# Patient Record
Sex: MALE | Race: WHITE | URBAN - METROPOLITAN AREA
[De-identification: names, ages, dates, MRNs, and addresses within clinical notes are randomized per-mention and may not be internally consistent; named-entity substitution may affect disease eponyms.]

---

## 2017-06-07 ENCOUNTER — OFFICE VISIT (OUTPATIENT)
Dept: OTOLARYNGOLOGY | Facility: CLINIC | Age: 22
End: 2017-06-07
Payer: COMMERCIAL

## 2017-06-07 ENCOUNTER — OFFICE VISIT (OUTPATIENT)
Dept: FAMILY MEDICINE | Facility: CLINIC | Age: 22
End: 2017-06-07
Payer: COMMERCIAL

## 2017-06-07 VITALS
WEIGHT: 207 LBS | BODY MASS INDEX: 28.04 KG/M2 | SYSTOLIC BLOOD PRESSURE: 127 MMHG | HEART RATE: 51 BPM | DIASTOLIC BLOOD PRESSURE: 75 MMHG | TEMPERATURE: 98.9 F | HEIGHT: 72 IN

## 2017-06-07 VITALS — WEIGHT: 207 LBS | BODY MASS INDEX: 28.04 KG/M2 | HEIGHT: 72 IN | TEMPERATURE: 97 F

## 2017-06-07 DIAGNOSIS — R49.0 HOARSENESS: Primary | ICD-10-CM

## 2017-06-07 DIAGNOSIS — Z23 ENCOUNTER FOR IMMUNIZATION: ICD-10-CM

## 2017-06-07 DIAGNOSIS — J04.0 LARYNGITIS: ICD-10-CM

## 2017-06-07 DIAGNOSIS — Z13.6 CARDIOVASCULAR SCREENING; LDL GOAL LESS THAN 160: ICD-10-CM

## 2017-06-07 LAB
CHOLEST SERPL-MCNC: 147 MG/DL
GLUCOSE SERPL-MCNC: 87 MG/DL (ref 70–99)
HDLC SERPL-MCNC: 55 MG/DL
LDLC SERPL CALC-MCNC: 79 MG/DL
NONHDLC SERPL-MCNC: 92 MG/DL
TRIGL SERPL-MCNC: 63 MG/DL

## 2017-06-07 PROCEDURE — 80061 LIPID PANEL: CPT | Performed by: FAMILY MEDICINE

## 2017-06-07 PROCEDURE — 36415 COLL VENOUS BLD VENIPUNCTURE: CPT | Performed by: FAMILY MEDICINE

## 2017-06-07 PROCEDURE — 90471 IMMUNIZATION ADMIN: CPT | Performed by: FAMILY MEDICINE

## 2017-06-07 PROCEDURE — 99213 OFFICE O/P EST LOW 20 MIN: CPT | Mod: 25 | Performed by: FAMILY MEDICINE

## 2017-06-07 PROCEDURE — 99204 OFFICE O/P NEW MOD 45 MIN: CPT | Mod: 25 | Performed by: OTOLARYNGOLOGY

## 2017-06-07 PROCEDURE — 90715 TDAP VACCINE 7 YRS/> IM: CPT | Performed by: FAMILY MEDICINE

## 2017-06-07 PROCEDURE — 82947 ASSAY GLUCOSE BLOOD QUANT: CPT | Performed by: FAMILY MEDICINE

## 2017-06-07 PROCEDURE — 31575 DIAGNOSTIC LARYNGOSCOPY: CPT | Performed by: OTOLARYNGOLOGY

## 2017-06-07 RX ORDER — METHYLPREDNISOLONE 4 MG
TABLET, DOSE PACK ORAL
Qty: 21 TABLET | Refills: 0 | Status: SHIPPED | OUTPATIENT
Start: 2017-06-07 | End: 2017-06-15

## 2017-06-07 ASSESSMENT — PAIN SCALES - GENERAL: PAINLEVEL: NO PAIN (0)

## 2017-06-07 NOTE — LETTER
[facility]  [address] [facility] [place] [address]  Phone: [phone]    June 7, 2017    Jefry Do  [address] [place] [address]          Dear Mr. Lei,    The results of your recent lab tests were within normal limits.  Component      Latest Ref Rng & Units 6/7/2017   Cholesterol      <200 mg/dL 147   Triglycerides      <150 mg/dL 63   HDL Cholesterol      >39 mg/dL 55   LDL Cholesterol Calculated      <100 mg/dL 79   Non HDL Cholesterol      <130 mg/dL 92   Glucose      70 - 99 mg/dL 87      If you have any further questions or problems, please contact our office.    Sincerely,      Shilo Hutchison MD / estephania

## 2017-06-07 NOTE — MR AVS SNAPSHOT
"              After Visit Summary   6/7/2017    Jefry Lei    MRN: [id number]           Patient Information     Date Of Birth          1995        Visit Information        Provider Department      6/7/2017 10:15 AM Qing Price MD [facility]        Today's Diagnoses     Hoarseness    -  1    Laryngitis          Care Instructions    Per Physician's instructions            Follow-ups after your visit        Additional Services     SPEECH THERAPY REFERRAL       *This therapy referral will be filtered to a centralized scheduling office at [facility] and the patient will receive a call to schedule an appointment at a [place] location most convenient for them. *     [facility] provides Speech Therapy evaluation and treatment and many specialty services across the [place] system.  If requesting a specialty program, please choose from the list below.  If you have not heard from the scheduling office within 2 business days, please call [phone] for all locations, with the exception of [place], please call [phone].       Treatment: Evaluation & Treatment  Speech Treatment Diagnosis: Dysphonia  Special Instructions: professional voice  Special Programs: Voice     Please be aware that coverage of these services is subject to the terms and limitations of your health insurance plan.  Call member services at your health plan with any benefit or coverage questions.      **Note to Provider:  If you are referring outside of [place] for the therapy appointment, please list the name of the location in the \"special instructions\" above, print the referral and give to the patient to schedule the appointment.                  Who to contact     If you have questions or need follow up information about today's clinic visit or your schedule please contact [facility] directly at [phone].  Normal or non-critical lab and imaging results will be " "communicated to you by [employer]azeM[employer], letter or phone within 4 business days after the clinic has received the results. If you do not hear from us within 7 days, please contact the clinic through StepOne or phone. If you have a critical or abnormal lab result, we will notify you by phone as soon as possible.  Submit refill requests through StepOne or call your pharmacy and they will forward the refill request to us. Please allow 3 business days for your refill to be completed.          Additional Information About Your Visit        StepOne Information     StepOne lets you send messages to your doctor, view your test results, renew your prescriptions, schedule appointments and more. To sign up, go to [email or web address] . Click on \"Log in\" on the left side of the screen, which will take you to the Welcome page. Then click on \"Sign up Now\" on the right side of the page.     You will be asked to enter the access code listed below, as well as some personal information. Please follow the directions to create your username and password.     Your access code is: [email or web address]  Expires: 2017  8:03 AM     Your access code will  in 90 days. If you need help or a new code, please call your [place] clinic or [phone].        Care EveryWhere ID     This is your Care EveryWhere ID. This could be used by other organizations to access your [facility] medical records  [id number]        Your Vitals Were     Pulse Temperature Height BMI (Body Mass Index)          51 98.9  F (37.2  C) (Oral) 1.829 m (6' 0.02\") 28.05 kg/m2         Blood Pressure from Last 3 Encounters:   17 127/75   14 141/62   14 123/70    Weight from Last 3 Encounters:   17 93.9 kg (207 lb)   17 93.9 kg (207 lb)   14 87.3 kg (192 lb 6.4 oz)              We Performed the Following     LARYNGOSCOPY FLEX FIBEROPTIC, DIAGNOSTIC     SPEECH THERAPY REFERRAL          Today's Medication Changes          These changes are " accurate as of: 6/7/17 10:37 AM.  If you have any questions, ask your nurse or doctor.               Start taking these medicines.        Dose/Directions    methylPREDNISolone 4 MG tablet   Commonly known as:  MEDROL DOSEPAK   Used for:  Hoarseness, Laryngitis   Started by:  Qing Price MD        Follow package instructions   Quantity:  21 tablet   Refills:  0            Where to get your medicines      These medications were sent to [facility] [address] [facility] [address] AT [facility] OF [address] [place] [address]  [address] [facility] [address]     Phone:  [phone]     methylPREDNISolone 4 MG tablet                Primary Care Provider Office Phone # Fax #    Shilo Hutchison [phone]       [facility]        [address] [facility] [address] [facility] [address]        Thank you!     Thank you for choosing [facility]  for your care. Our goal is always to provide you with excellent care. Hearing back from our patients is one way we can continue to improve our services. Please take a few minutes to complete the written survey that you may receive in the mail after your visit with us. Thank you!             Your Updated Medication List - Protect others around you: Learn how to safely use, store and throw away your medicines at www.disposemymeds.org.          This list is accurate as of: 6/7/17 10:37 AM.  Always use your most recent med list.                   Brand Name Dispense Instructions for use    methylPREDNISolone 4 MG tablet    MEDROL DOSEPAK    21 tablet    Follow package instructions

## 2017-06-07 NOTE — PROGRESS NOTES
SUBJECTIVE:                                                    Jefry Lei is a 22 year old male who presents to clinic today for the following health issues:    Loss of Voice       Duration: 3-4 weeks now     Description (location/character/radiation): pt is concerned about a loss of voice. 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 Ever since then he has had a hoarse voice. He thought it was getting better but it has not.  Pt denies any other cold or flu symptoms, no throat pain.        Patient denies tobacco smoking or any use of tobacco products.  Patient states he has had hoarseness about twice a year since he graduated high school, but usually would get it back within a week.  This time, his hoarseness just persisted for the last 3-4 weeks.  This time, patient has not tried any treatment per his report.  Patient is a  and a .  Patient denies known seasonal allergies.  Patient has been exposed to second hand smoke last year only; denies current exposure to smoke or other environmental pollutants.    Problem list and histories reviewed & adjusted, as indicated.  Additional history: as documented    Patient Active Problem List   Diagnosis     CARDIOVASCULAR SCREENING; LDL GOAL LESS THAN 160     Past Surgical History:   Procedure Laterality Date     SURGICAL HISTORY OF -   2014    wisdom teeth removal       Social History   Substance Use Topics     Smoking status: Never Smoker     Smokeless tobacco: Never Used     Alcohol use No     Family History   Problem Relation Age of Onset     Hypertension Mother          Current Outpatient Prescriptions   Medication Sig Dispense Refill     methylPREDNISolone (MEDROL DOSEPAK) 4 MG tablet Follow package instructions 21 tablet 0     No Known Allergies    Reviewed and updated as needed this visit by clinical staff  Tobacco  Allergies  Meds  Problems  Med Hx  Surg Hx  Fam Hx  Soc Hx        Reviewed and  "updated as needed this visit by Provider  Allergies  Meds  Problems         ROS:  C: NEGATIVE for fever, chills, change in weight  I: NEGATIVE for worrisome rashes, moles or lesions  E: NEGATIVE for vision changes or irritation  ENT/MOUTH: see above  RESP:as above  CV: NEGATIVE for chest pain, palpitations or peripheral edema  GI: NEGATIVE for nausea, abdominal pain, heartburn, or change in bowel habits  M: NEGATIVE for significant arthralgias or myalgia    OBJECTIVE:                                                    Temp 97  F (36.1  C) (Tympanic)  Ht 6' 0.25\" (1.835 m)  Wt 207 lb (93.9 kg)  BMI 27.88 kg/m2  Body mass index is 27.88 kg/(m^2).  GENERAL: alert and no distress  HENT: Ears - tympanic membrane intact and nonerythematous; Nose - no congestion, pale swollen turbinates, no mucus present, no sinus tenderness bilaterally; throat nonerythematous; hoarse voice  NECK: nontender anterior cervical lymphadenopathy present.  RESP: lungs clear to auscultation - no rales, no rhonchi, no wheezes  CV: regular rates and rhythm, normal S1 S2, no S3 or S4 and no murmur  SKIN:no rashes    Diagnostic test results:  Diagnostic Test Results:  none      ASSESSMENT/PLAN:                                                        ICD-10-CM    1. Hoarseness R49.0 OTOLARYNGOLOGY REFERRAL   2. Laryngitis J04.0 OTOLARYNGOLOGY REFERRAL   3. CARDIOVASCULAR SCREENING; LDL GOAL LESS THAN 160 Z13.6 Lipid Profile with reflex to direct LDL     Glucose   4. Encounter for immunization Z23 TDAP VACCINE (ADACEL)     ADMIN 1st VACCINE     Patient has only hoarsness with no other abnormal finding on exam.  Most likely still vocal cord strain.  Other possible but less likely differentials would be vocal cord polyp or vocal cord paralysis.  Patient has no known risk for pharyngeal/laryngeal neoplasm.  Patient concurred with ENT consult - ordered due to duration of hoarseness.  Relative voice rest.  Return precautions discussed and given to " patient.      Follow up with Provider - prn   Patient Instructions   Due to duration of hoarseness, consult ENT.  Schedule appointment with the specialty clinic. Go to [facility] now to request for appointment.    Azeb tea: boil peeled azeb root for 5 minutes (until you smell the azeb aroma), take it off the heat then.  Add a little honey and lemon juice. Sip on the liquid while warm.    You will be contacted in 1-2 days for results of your lab tests.      Shilo Hutchison MD  [facility]

## 2017-06-07 NOTE — NURSING NOTE
"Initial /75 (BP Location: Left arm, Patient Position: Chair, Cuff Size: Adult Regular)  Pulse 51  Temp 98.9  F (37.2  C) (Oral)  Ht 1.829 m (6' 0.02\")  Wt 93.9 kg (207 lb)  BMI 28.05 kg/m2 Estimated body mass index is 28.05 kg/(m^2) as calculated from the following:    Height as of this encounter: 1.829 m (6' 0.02\").    Weight as of this encounter: 93.9 kg (207 lb). .    Marifer Lazcano CMA    "

## 2017-06-07 NOTE — NURSING NOTE
This laryngoscopy scope was  used on this patient.    Olympus Flexible   # [id number]    Adult

## 2017-06-07 NOTE — PATIENT INSTRUCTIONS
Due to duration of hoarseness, consult ENT.  Schedule appointment with the specialty clinic. Go to Clinic [facility] now to request for appointment.    Azeb tea: boil peeled azeb root for 5 minutes (until you smell the azeb aroma), take it off the heat then.  Add a little honey and lemon juice. Sip on the liquid while warm.    You will be contacted in 1-2 days for results of your lab tests.

## 2017-06-07 NOTE — MR AVS SNAPSHOT
After Visit Summary   6/7/2017    Jefry Lei    MRN: [id number]           Patient Information     Date Of Birth          1995        Visit Information        Provider Department      6/7/2017 7:20 AM Shilo Hutchison MD [facility]        Today's Diagnoses     Hoarseness    -  1    Laryngitis        CARDIOVASCULAR SCREENING; LDL GOAL LESS THAN 160          Care Instructions    Due to duration of hoarseness, consult ENT.  Schedule appointment with the specialty clinic. Go to Clinic [facility] now to request for appointment.    Azeb tea: boil peeled azeb root for 5 minutes (until you smell the azeb aroma), take it off the heat then.  Add a little honey and lemon juice. Sip on the liquid while warm.    You will be contacted in 1-2 days for results of your lab tests.          Follow-ups after your visit        Additional Services     OTOLARYNGOLOGY REFERRAL       Your provider has referred you to: [facility]: [facility] [phone]   [email or web address]    Please be aware that coverage of these services is subject to the terms and limitations of your health insurance plan.  Call member services at your health plan with any benefit or coverage questions.      Please bring the following with you to your appointment:    (1) Any X-Rays, CTs or MRIs which have been performed.  Contact the facility where they were done to arrange for  prior to your scheduled appointment.   (2) List of current medications  (3) This referral request   (4) Any documents/labs given to you for this referral                  Follow-up notes from your care team     Return if symptoms worsen or fail to improve.      Who to contact     If you have questions or need follow up information about today's clinic visit or your schedule please contact [facility] directly at [phone].  Normal or non-critical lab and imaging results will be communicated to  "you by MyChart, letter or phone within 4 business days after the clinic has received the results. If you do not hear from us within 7 days, please contact the clinic through [employer] or phone. If you have a critical or abnormal lab result, we will notify you by phone as soon as possible.  Submit refill requests through Triductor or call your pharmacy and they will forward the refill request to us. Please allow 3 business days for your refill to be completed.          Additional Information About Your Visit        IPexpertharCrisp Information     Triductor lets you send messages to your doctor, view your test results, renew your prescriptions, schedule appointments and more. To sign up, go to [email or web address] . Click on \"Log in\" on the left side of the screen, which will take you to the Welcome page. Then click on \"Sign up Now\" on the right side of the page.     You will be asked to enter the access code listed below, as well as some personal information. Please follow the directions to create your username and password.     Your access code is: [email or web address]  Expires: 2017  8:03 AM     Your access code will  in 90 days. If you need help or a new code, please call your [place] clinic or [phone].        Care EveryWhere ID     This is your Care EveryWhere ID. This could be used by other organizations to access your [place] medical records  [id number]        Your Vitals Were     Temperature Height BMI (Body Mass Index)             97  F (36.1  C) (Tympanic) 6' 0.25\" (1.835 m) 27.88 kg/m2          Blood Pressure from Last 3 Encounters:   14 141/62   14 123/70   13 132/61    Weight from Last 3 Encounters:   17 207 lb (93.9 kg)   14 192 lb 6.4 oz (87.3 kg)   14 188 lb 6.4 oz (85.5 kg) (86 %)*     * Growth percentiles are based on CDC 2-20 Years data.              We Performed the Following     Glucose     Lipid Profile with reflex to direct LDL     OTOLARYNGOLOGY REFERRAL     "    Primary Care Provider Office Phone # Fax #    Shilo Hutchison [phone]       [facility]        [address] [facility] [address]        Thank you!     Thank you for choosing [facility]  for your care. Our goal is always to provide you with excellent care. Hearing back from our patients is one way we can continue to improve our services. Please take a few minutes to complete the written survey that you may receive in the mail after your visit with us. Thank you!             Your Updated Medication List - Protect others around you: Learn how to safely use, store and throw away your medicines at www.disposemymeds.org.      Notice  As of 6/7/2017  8:03 AM    You have not been prescribed any medications.

## 2017-06-07 NOTE — PROGRESS NOTES
"    History of Present Illness - Jefry Lei is a 22 year old male who presents with a 3 week history of voice disturbance. 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 His voice was raspy intially, but then after 24hours it got even worse and has not improved since then. He uses his voice loudly as a hockey . He also does some singing, but has not in the past 3 weeks. He has experienced several much shorter episodes of poor voice following periods of vocal overuse, but never for this long.     Past Medical History -   Patient Active Problem List   Diagnosis     CARDIOVASCULAR SCREENING; LDL GOAL LESS THAN 160       Current Medications -   Current Outpatient Prescriptions:      methylPREDNISolone (MEDROL DOSEPAK) 4 MG tablet, Follow package instructions, Disp: 21 tablet, Rfl: 0    Allergies - No Known Allergies    Social History -   Social History     Social History     Marital status: Single     Spouse name: N/A     Number of children: N/A     Years of education: N/A     Social History Main Topics     Smoking status: Never Smoker     Smokeless tobacco: Never Used     Alcohol use No     Drug use: No     Sexual activity: Yes     Partners: Female     Birth control/ protection: Condom     Other Topics Concern     Parent/Sibling W/ Cabg, Mi Or Angioplasty Before 65f 55m? No     Social History Narrative       Family History -   Family History   Problem Relation Age of Onset     Hypertension Mother        Review of Systems - As per HPI and PMHx, otherwise 7 system review of the head and neck negative. 10+ system review negative.    Physical Exam  /75 (BP Location: Left arm, Patient Position: Chair, Cuff Size: Adult Regular)  Pulse 51  Temp 98.9  F (37.2  C) (Oral)  Ht 1.829 m (6' 0.02\")  Wt 93.9 kg (207 lb)  BMI 28.05 kg/m2  General - The patient is well nourished and well developed, and appears to have good nutritional status.  Alert and oriented to person and place, answers questions " and cooperates with examination appropriately.   Head and Face - Normocephalic and atraumatic, with no gross asymmetry noted of the contour of the facial features.  The facial nerve is intact, with strong symmetric movements.  Voice and Breathing - The patient was breathing comfortably without the use of accessory muscles. There was no wheezing, stridor, or stertor.  The patients voice was coarse but not breathy, and cough was strong.  Ears - Bilateral pinna and EACs with normal appearing overlying skin. Tympanic membrane intact with good mobility on pneumatic otoscopy bilaterally. Bony landmarks of the ossicular chain are normal. The tympanic membranes are normal in appearance. No retraction, perforation, or masses.  No fluid or purulence was seen in the external canal or the middle ear.   Eyes - Extraocular movements intact.  Sclera were not icteric or injected, conjunctiva were pink and moist.  Mouth - Examination of the oral cavity showed pink, healthy oral mucosa. No lesions or ulcerations noted.  The tongue was mobile and midline, and the dentition were in good condition.    Throat - The walls of the oropharynx were smooth, pink, moist, symmetric, and had no lesions or ulcerations.  The tonsillar pillars and soft palate were symmetric.  The uvula was midline on elevation.  Neck - Normal midline excursion of the laryngotracheal complex during swallowing.  Full range of motion on passive movement.  Palpation of the occipital, submental, submandibular, internal jugular chain, and supraclavicular nodes did not demonstrate any abnormal lymph nodes or masses.  The carotid pulse was palpable bilaterally.  Palpation of the thyroid was soft and smooth, with no nodules or goiter appreciated.  The trachea was mobile and midline.  Nose - External contour is symmetric, no gross deflection or scars.  Nasal mucosa is pink and moist with no abnormal mucus.  The septum was midline and non-obstructive, turbinates of normal size  and position.  No polyps, masses, or purulence noted on examination.  Heart:  Regular rate and rhythm, no murmurs.  Lungs:  Chest clear to auscultation bilaterally      Procedure: Flexible Endoscopy  Indication: hoarseness    Attempts at mirror laryngoscopy were not possible due to gag reflex.  Therefore I proceeded with a fiberoptic examination.  First I sprayed both sides of the nose with a mixture of lidocaine and neosynephrine.  I then passed the scope through the nasal cavity.  The nasal cavity was unremarkable.  The nasopharynx was mucosally covered and symmetric.  The Eustachian tube openings were unobstructed.  Going further down I had a clear view of the base of tongue which had normal appearing lingual tonsillar tissue.  The base of tongue was free of lesions, and the vallecula was open.  The epiglottis was smooth and mucosally covered.  The supraglottic larynx was then clearly visualized.  The vocal cords moved smoothly and symmetrically, they were moderately edematous bilaterally with erythema, but no leukoplakia and no sign of vocal nodules.  The pyriform sinuses were open, and the limited view of the postcricoid region did not show any lesions.      Chris Lei is a 22 year old male with laryngitis secondary to voice overuse. I started a burst of steroids to reduce the inflammation of the vocal folds. I recommended he avoid vocally intensive activities such as yelling or singing while on steroids to avoid risk of vocal hemorrhage. I also placed a referral to voice therapy as he seems to be an intensive voice user. Return in 6 weeks if not improved.       Dr. Qing Price MD  Otolaryngology  [facility]

## 2017-06-15 ENCOUNTER — OFFICE VISIT (OUTPATIENT)
Dept: FAMILY MEDICINE | Facility: CLINIC | Age: 22
End: 2017-06-15
Payer: COMMERCIAL

## 2017-06-15 VITALS
SYSTOLIC BLOOD PRESSURE: 117 MMHG | DIASTOLIC BLOOD PRESSURE: 67 MMHG | HEIGHT: 72 IN | HEART RATE: 66 BPM | WEIGHT: 206.5 LBS | BODY MASS INDEX: 27.97 KG/M2

## 2017-06-15 DIAGNOSIS — R49.0 HOARSENESS: ICD-10-CM

## 2017-06-15 DIAGNOSIS — J04.0 LARYNGITIS: ICD-10-CM

## 2017-06-15 PROCEDURE — 99213 OFFICE O/P EST LOW 20 MIN: CPT | Performed by: FAMILY MEDICINE

## 2017-06-15 RX ORDER — METHYLPREDNISOLONE 4 MG
TABLET, DOSE PACK ORAL
Qty: 21 TABLET | Refills: 0 | Status: SHIPPED | OUTPATIENT
Start: 2017-06-15 | End: 2017-07-11

## 2017-06-15 NOTE — PATIENT INSTRUCTIONS
Try one more course of methylprednisolone.  Voice rest for several days until voice recovers.  See Dr. Mercedes again for follow up and further evaluation and treatment.  Schedule voice therapy as recommended by Dr. Mercedes.    Azeb tea: boil peeled azeb root for 5 minutes (until you smell the azeb aroma), take it off the heat then.  Add a little honey and lemon juice. Sip on the liquid while warm.

## 2017-06-15 NOTE — PROGRESS NOTES
SUBJECTIVE:                                                    Jefry Lei is a 22 year old male who presents to clinic today for the following health issues:      ENT Symptoms             Symptoms: cc Present Absent Comment   Fever/Chills   x    Fatigue   x    Muscle Aches   x    Eye Irritation   x    Sneezing   x    Nasal Alexis/Drg   x    Sinus Pressure/Pain   x    Loss of smell   x    Dental pain   x    Sore Throat   x    Swollen Glands   x    Ear Pain/Fullness   x    Cough   x    Wheeze   x    Chest Pain   x    Shortness of breath   x    Rash   x    Other X   Loss of voice for the past five weeks   Is a sport   -  Hockey     Did loose voice earlier this which lasted for around one month      Symptom duration:  4-5 days    Symptom severity:  severe   Treatments tried:  Advil    Contacts:  None      Patient states after his medrol dose pack, he had mild improvement but harseness got worse again few days after.  He continues to  so has not really had voice rest.  He claims he has one more game to , then he is off for the weekend.      Patient denies sore throat, cough, nasal congestion, dysphagia, dyspnea or wheezing.      Problem list and histories reviewed & adjusted, as indicated.  Additional history: as documented    Patient Active Problem List   Diagnosis     CARDIOVASCULAR SCREENING; LDL GOAL LESS THAN 160     Past Surgical History:   Procedure Laterality Date     SURGICAL HISTORY OF -   2014    wisdom teeth removal       Social History   Substance Use Topics     Smoking status: Never Smoker     Smokeless tobacco: Never Used     Alcohol use No     Family History   Problem Relation Age of Onset     Hypertension Mother          Current Outpatient Prescriptions   Medication Sig Dispense Refill     methylPREDNISolone (MEDROL DOSEPAK) 4 MG tablet Follow package instructions 21 tablet 0     No Known Allergies    Reviewed and updated as needed this visit by clinical staff  Tobacco  Allergies  Meds  " Problems  Med Hx  Surg Hx  Fam Hx  Soc Hx        Reviewed and updated as needed this visit by Provider  Allergies  Meds  Problems         ROS:  C: NEGATIVE for fever, chills, change in weight  ENT/MOUTH: see above  R: NEGATIVE for significant cough or SOB  CV: NEGATIVE for chest pain, palpitations or peripheral edema  GI: NEGATIVE for nausea, abdominal pain, heartburn, or change in bowel habits  N: NEGATIVE for weakness, dizziness or paresthesias    OBJECTIVE:                                                    /67  Pulse 66  Ht 6' 0.25\" (1.835 m)  Wt 206 lb 8 oz (93.7 kg)  BMI 27.81 kg/m2  Body mass index is 27.81 kg/(m^2).  GEN: alert, oriented x 3, NAD  ENT: no nasal congestion, throat not erythematous, very hoarse raspy voice  NECK: no swelling    Diagnostic test results:  Diagnostic Test Results:  none      ASSESSMENT/PLAN:                                                        ICD-10-CM    1. Hoarseness R49.0 methylPREDNISolone (MEDROL DOSEPAK) 4 MG tablet   2. Laryngitis J04.0 methylPREDNISolone (MEDROL DOSEPAK) 4 MG tablet     Discussed with patient treatment and ENT recommendations.  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  Return precautions discussed and given to patient.      Follow up with Provider - ENT follow up.   Patient Instructions   Try one more course of methylprednisolone.  Voice rest for several days until voice recovers.  See Dr. Mercedes again for follow up and further evaluation and treatment.  Schedule voice therapy as recommended by Dr. Mercedes.    Ginger tea: boil peeled ginger root for 5 minutes (until you smell the ginger aroma), take it off the heat then.  Add a little honey and lemon juice. Sip on the liquid while warm.      Shilo Hutchison MD  [facility]    "

## 2017-06-15 NOTE — MR AVS SNAPSHOT
"              After Visit Summary   6/15/2017    Jefry Lei    MRN: [id number]           Patient Information     Date Of Birth          1995        Visit Information        Provider Department      6/15/2017 12:40 PM Shilo Hutchison MD [facility]        Today's Diagnoses     Hoarseness        Laryngitis          Care Instructions    Try one more course of methylprednisolone.  Voice rest for several days until voice recovers.  See Dr. Mercedes again for follow up and further evaluation and treatment.  Schedule voice therapy as recommended by Dr. Mercedes.    Azeb tea: boil peeled azeb root for 5 minutes (until you smell the azeb aroma), take it off the heat then.  Add a little honey and lemon juice. Sip on the liquid while warm.          Follow-ups after your visit        Who to contact     If you have questions or need follow up information about today's clinic visit or your schedule please contact [facility] directly at [phone].  Normal or non-critical lab and imaging results will be communicated to you by SpazioDatihart, letter or phone within 4 business days after the clinic has received the results. If you do not hear from us within 7 days, please contact the clinic through BrightNestt or phone. If you have a critical or abnormal lab result, we will notify you by phone as soon as possible.  Submit refill requests through Mobshop or call your pharmacy and they will forward the refill request to us. Please allow 3 business days for your refill to be completed.          Additional Information About Your Visit        SpazioDatihart Information     Mobshop lets you send messages to your doctor, view your test results, renew your prescriptions, schedule appointments and more. To sign up, go to [email or web address] . Click on \"Log in\" on the left side of the screen, which will take you to the Welcome page. Then click on \"Sign up Now\" on the right side of the page.     You will be " "asked to enter the access code listed below, as well as some personal information. Please follow the directions to create your username and password.     Your access code is: [email or web address]  Expires: 2017  8:03 AM     Your access code will  in 90 days. If you need help or a new code, please call your [facility] or [phone].        Care EveryWhere ID     This is your Care EveryWhere ID. This could be used by other organizations to access your [place] medical records  [id number]        Your Vitals Were     Pulse Height BMI (Body Mass Index)             66 6' 0.25\" (1.835 m) 27.81 kg/m2          Blood Pressure from Last 3 Encounters:   06/15/17 117/67   17 127/75   14 141/62    Weight from Last 3 Encounters:   06/15/17 206 lb 8 oz (93.7 kg)   17 207 lb (93.9 kg)   17 207 lb (93.9 kg)              Today, you had the following     No orders found for display         Where to get your medicines      These medications were sent to "[facility] [address] [place] [address] AT [facility] OF [address] [place] [address]  [address] [facility] [address]     Phone:  [phone]     methylPREDNISolone 4 MG tablet          Primary Care Provider Office Phone # Fax #    Shilo Hutchison [phone]       [facility]        [address] [place] [address]        Thank you!     Thank you for choosing [facility]  for your care. Our goal is always to provide you with excellent care. Hearing back from our patients is one way we can continue to improve our services. Please take a few minutes to complete the written survey that you may receive in the mail after your visit with us. Thank you!             Your Updated Medication List - Protect others around you: Learn how to safely use, store and throw away your medicines at www.disposemymeds.org.          This list is accurate as of: 6/15/17 12:49 PM.  " Always use your most recent med list.                   Brand Name Dispense Instructions for use    methylPREDNISolone 4 MG tablet    MEDROL DOSEPAK    21 tablet    Follow package instructions

## 2017-06-15 NOTE — NURSING NOTE
"Chief Complaint   Patient presents with     URI       Initial /67  Pulse 66  Ht 6' 0.25\" (1.835 m)  Wt 206 lb 8 oz (93.7 kg)  BMI 27.81 kg/m2 Estimated body mass index is 27.81 kg/(m^2) as calculated from the following:    Height as of this encounter: 6' 0.25\" (1.835 m).    Weight as of this encounter: 206 lb 8 oz (93.7 kg).  Medication Reconciliation: complete   Eliana Sims MA      "

## 2017-07-11 ENCOUNTER — OFFICE VISIT (OUTPATIENT)
Dept: OTOLARYNGOLOGY | Facility: CLINIC | Age: 22
End: 2017-07-11
Payer: COMMERCIAL

## 2017-07-11 VITALS
HEART RATE: 68 BPM | BODY MASS INDEX: 27.9 KG/M2 | WEIGHT: 206 LBS | SYSTOLIC BLOOD PRESSURE: 124 MMHG | HEIGHT: 72 IN | DIASTOLIC BLOOD PRESSURE: 74 MMHG | TEMPERATURE: 98.5 F

## 2017-07-11 DIAGNOSIS — R49.0 HOARSENESS: Primary | ICD-10-CM

## 2017-07-11 PROCEDURE — 99213 OFFICE O/P EST LOW 20 MIN: CPT | Performed by: OTOLARYNGOLOGY

## 2017-07-11 RX ORDER — NICOTINE POLACRILEX 4 MG/1
20 GUM, CHEWING ORAL 2 TIMES DAILY
Qty: 60 TABLET | Refills: 1 | Status: SHIPPED | OUTPATIENT
Start: 2017-07-11 | End: 2017-08-10

## 2017-07-11 ASSESSMENT — PAIN SCALES - GENERAL: PAINLEVEL: NO PAIN (0)

## 2017-07-11 NOTE — MR AVS SNAPSHOT
"              After Visit Summary   2017    Jefry Lei    MRN: [id number]           Patient Information     Date Of Birth          1995        Visit Information        Provider Department      2017 1:00 PM Qing Price MD [facility]        Today's Diagnoses     Hoarseness    -  1      Care Instructions    Per Physician's instructions            Follow-ups after your visit        Who to contact     If you have questions or need follow up information about today's clinic visit or your schedule please contact [facility] directly at [phone].  Normal or non-critical lab and imaging results will be communicated to you by Offerialhart, letter or phone within 4 business days after the clinic has received the results. If you do not hear from us within 7 days, please contact the clinic through Offerialhart or phone. If you have a critical or abnormal lab result, we will notify you by phone as soon as possible.  Submit refill requests through [employer] or call your pharmacy and they will forward the refill request to us. Please allow 3 business days for your refill to be completed.          Additional Information About Your Visit        MyChart Information     [employer] lets you send messages to your doctor, view your test results, renew your prescriptions, schedule appointments and more. To sign up, go to [email or web address] . Click on \"Log in\" on the left side of the screen, which will take you to the Welcome page. Then click on \"Sign up Now\" on the right side of the page.     You will be asked to enter the access code listed below, as well as some personal information. Please follow the directions to create your username and password.     Your access code is: [email or web address]  Expires: 2017  8:03 AM     Your access code will  in 90 days. If you need help or a new code, please call your [facility] or [phone].        Care EveryWhere ID     This is your Care EveryWhere " "ID. This could be used by other organizations to access your [facility] medical records  [id number]        Your Vitals Were     Pulse Temperature Height BMI (Body Mass Index)          68 98.5  F (36.9  C) (Oral) 1.835 m (6' 0.25\") 27.75 kg/m2         Blood Pressure from Last 3 Encounters:   07/11/17 124/74   06/15/17 117/67   06/07/17 127/75    Weight from Last 3 Encounters:   07/11/17 93.4 kg (206 lb)   06/15/17 93.7 kg (206 lb 8 oz)   06/07/17 93.9 kg (207 lb)              Today, you had the following     No orders found for display         Today's Medication Changes          These changes are accurate as of: 7/11/17  2:00 PM.  If you have any questions, ask your nurse or doctor.               Start taking these medicines.        Dose/Directions    omeprazole 20 MG tablet   Used for:  Hoarseness   Started by:  Qing Price MD        Dose:  20 mg   Take 1 tablet (20 mg) by mouth 2 times daily Take 30-60 minutes before a meal.   Quantity:  60 tablet   Refills:  1         Stop taking these medicines if you haven't already. Please contact your care team if you have questions.     methylPREDNISolone 4 MG tablet   Commonly known as:  MEDROL DOSEPAK   Stopped by:  Qing Price MD                Where to get your medicines      These medications were sent to [facility] [address] [place] [address] [address] [facility] AT [facility] OF [address] [place] [address]  [address] [facility] [address]     Phone:  [phone]     omeprazole 20 MG tablet                Primary Care Provider Office Phone # Fax #    Shilo Hutchison [phone]       [facility]        [address] [place] [address]        Equal Access to Services     HUMBERTO SHARP AH: Janette Durán, kaylan man, luz marina howell. So [facility] [phone].    ATENCIÓN: Si habla español, tiene a alonso disposición servicios gratuitos de " asistencia lingüística. [place] al [phone].    We comply with applicable federal civil rights laws and [place] laws. We do not discriminate on the basis of race, color, national origin, age, disability sex, sexual orientation or gender identity.            Thank you!     Thank you for choosing [facility]  for your care. Our goal is always to provide you with excellent care. Hearing back from our patients is one way we can continue to improve our services. Please take a few minutes to complete the written survey that you may receive in the mail after your visit with us. Thank you!             Your Updated Medication List - Protect others around you: Learn how to safely use, store and throw away your medicines at www.disposemymeds.org.          This list is accurate as of: 7/11/17  2:00 PM.  Always use your most recent med list.                   Brand Name Dispense Instructions for use Diagnosis    omeprazole 20 MG tablet     60 tablet    Take 1 tablet (20 mg) by mouth 2 times daily Take 30-60 minutes before a meal.    Hoarseness

## 2017-07-11 NOTE — NURSING NOTE
"Initial /74 (BP Location: Right arm, Patient Position: Chair, Cuff Size: Adult Regular)  Pulse 68  Temp 98.5  F (36.9  C) (Oral)  Ht 1.835 m (6' 0.25\")  Wt 93.4 kg (206 lb)  BMI 27.75 kg/m2 Estimated body mass index is 27.75 kg/(m^2) as calculated from the following:    Height as of this encounter: 1.835 m (6' 0.25\").    Weight as of this encounter: 93.4 kg (206 lb). .    Marifer Lazcano CMA    "

## 2017-07-11 NOTE — PROGRESS NOTES
"History of Present Illness - Jefry eLi is a 22 year old male who returns with continued hoarseness. I saw him 4 weeks ago and flexible laryngoscopy demonstrated bilateral true vocal fold edema and erythema, but no nodules or mass lesions. He was treated with steroids, which helped, but his voice worsened again afterwards. His PCP recently treated him with another round of steroids. He has been drinking tea throughout the day for his voice, but his throat still feels dry. 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 He has not followed with voice therapy.    Past Medical History -   Patient Active Problem List   Diagnosis     CARDIOVASCULAR SCREENING; LDL GOAL LESS THAN 160       Current Medications -   Current Outpatient Prescriptions:      omeprazole 20 MG tablet, Take 1 tablet (20 mg) by mouth 2 times daily Take 30-60 minutes before a meal., Disp: 60 tablet, Rfl: 1    Allergies - No Known Allergies    Social History -   Social History     Social History     Marital status: Single     Spouse name: N/A     Number of children: N/A     Years of education: N/A     Social History Main Topics     Smoking status: Never Smoker     Smokeless tobacco: Never Used     Alcohol use No     Drug use: No     Sexual activity: Yes     Partners: Female     Birth control/ protection: Condom     Other Topics Concern     Parent/Sibling W/ Cabg, Mi Or Angioplasty Before 65f 55m? No     Social History Narrative       Family History -   Family History   Problem Relation Age of Onset     Hypertension Mother        Review of Systems - As per HPI and PMHx, otherwise 7 system review of the head and neck negative. 10+ system review negative.    Exam:  /74 (BP Location: Right arm, Patient Position: Chair, Cuff Size: Adult Regular)  Pulse 68  Temp 98.5  F (36.9  C) (Oral)  Ht 1.835 m (6' 0.25\")  Wt 93.4 kg (206 lb)  BMI 27.75 kg/m2  General - The patient is well nourished and well developed, and appears " to have good nutritional status.  Alert and oriented to person and place, answers questions and cooperates with examination appropriately.   Head and Face - Normocephalic and atraumatic, with no gross asymmetry noted of the contour of the facial features.  The facial nerve is intact, with strong symmetric movements.  Eyes - Extraocular movements intact.  Sclera were not icteric or injected, conjunctiva were pink and moist.  Voice - good amplitude, but coarse quality. No stridor.       A/P - Jefry Lei is a 22 year old male with vocal overuse leading to laryngitis and dysphonia. I recommended omeprazole to perhaps control occult reflux. I also suggested using Biotene for his dry throat symptoms. I strongly encouraged him to followup with voice therapy. Return after a course of therapy if voice remains poor.      Dr. Qing Price MD  Otolaryngology  [facility]

## 2019-11-06 ENCOUNTER — HEALTH MAINTENANCE LETTER (OUTPATIENT)
Age: 24
End: 2019-11-06

## 2020-11-29 ENCOUNTER — HEALTH MAINTENANCE LETTER (OUTPATIENT)
Age: 25
End: 2020-11-29

## 2021-09-19 ENCOUNTER — HEALTH MAINTENANCE LETTER (OUTPATIENT)
Age: 26
End: 2021-09-19

## 2022-01-09 ENCOUNTER — HEALTH MAINTENANCE LETTER (OUTPATIENT)
Age: 27
End: 2022-01-09

## 2022-11-21 ENCOUNTER — HEALTH MAINTENANCE LETTER (OUTPATIENT)
Age: 27
End: 2022-11-21

## 2023-04-16 ENCOUNTER — HEALTH MAINTENANCE LETTER (OUTPATIENT)
Age: 28
End: 2023-04-16